# Patient Record
Sex: MALE | Employment: FULL TIME | URBAN - METROPOLITAN AREA
[De-identification: names, ages, dates, MRNs, and addresses within clinical notes are randomized per-mention and may not be internally consistent; named-entity substitution may affect disease eponyms.]

---

## 2022-05-31 ENCOUNTER — TELEPHONE (OUTPATIENT)
Dept: OTHER | Age: 47
End: 2022-05-31

## 2022-05-31 NOTE — TELEPHONE ENCOUNTER
Pt name and  verified. Pt is calling in to schedule NP Appt.     Is still awaiting NP paperwork      Future Appointments   Date Time Provider Jewell Odell   2022  8:00 AM MD Chris Tomas [address] [place] [address]       [phone] (home)     Saba Elizalde